# Patient Record
Sex: FEMALE | Race: WHITE | NOT HISPANIC OR LATINO
[De-identification: names, ages, dates, MRNs, and addresses within clinical notes are randomized per-mention and may not be internally consistent; named-entity substitution may affect disease eponyms.]

---

## 2019-12-16 ENCOUNTER — APPOINTMENT (OUTPATIENT)
Dept: RADIOLOGY | Facility: CLINIC | Age: 74
End: 2019-12-16

## 2019-12-16 ENCOUNTER — APPOINTMENT (OUTPATIENT)
Dept: MAMMOGRAPHY | Facility: CLINIC | Age: 74
End: 2019-12-16
Payer: MEDICARE

## 2019-12-16 PROCEDURE — 77067 SCR MAMMO BI INCL CAD: CPT

## 2019-12-16 PROCEDURE — 77080 DXA BONE DENSITY AXIAL: CPT

## 2019-12-16 PROCEDURE — 77063 BREAST TOMOSYNTHESIS BI: CPT

## 2021-04-28 ENCOUNTER — APPOINTMENT (OUTPATIENT)
Dept: DERMATOLOGY | Facility: CLINIC | Age: 76
End: 2021-04-28
Payer: MEDICARE

## 2021-04-28 PROBLEM — Z00.00 ENCOUNTER FOR PREVENTIVE HEALTH EXAMINATION: Status: ACTIVE | Noted: 2021-04-28

## 2021-04-28 PROCEDURE — 11102 TANGNTL BX SKIN SINGLE LES: CPT

## 2021-04-28 PROCEDURE — 99202 OFFICE O/P NEW SF 15 MIN: CPT | Mod: 25

## 2021-04-28 PROCEDURE — 11103 TANGNTL BX SKIN EA SEP/ADDL: CPT | Mod: 59

## 2021-05-13 ENCOUNTER — APPOINTMENT (OUTPATIENT)
Dept: OBGYN | Facility: CLINIC | Age: 76
End: 2021-05-13
Payer: MEDICARE

## 2021-05-13 VITALS
HEIGHT: 62 IN | SYSTOLIC BLOOD PRESSURE: 130 MMHG | BODY MASS INDEX: 32.39 KG/M2 | DIASTOLIC BLOOD PRESSURE: 80 MMHG | WEIGHT: 176 LBS

## 2021-05-13 DIAGNOSIS — R35.0 FREQUENCY OF MICTURITION: ICD-10-CM

## 2021-05-13 DIAGNOSIS — Z12.31 ENCOUNTER FOR SCREENING MAMMOGRAM FOR MALIGNANT NEOPLASM OF BREAST: ICD-10-CM

## 2021-05-13 DIAGNOSIS — N81.6 RECTOCELE: ICD-10-CM

## 2021-05-13 DIAGNOSIS — R35.1 NOCTURIA: ICD-10-CM

## 2021-05-13 DIAGNOSIS — N95.2 POSTMENOPAUSAL ATROPHIC VAGINITIS: ICD-10-CM

## 2021-05-13 DIAGNOSIS — Z01.419 ENCOUNTER FOR GYNECOLOGICAL EXAMINATION (GENERAL) (ROUTINE) W/OUT ABNORMAL FINDINGS: ICD-10-CM

## 2021-05-13 DIAGNOSIS — R31.29 OTHER MICROSCOPIC HEMATURIA: ICD-10-CM

## 2021-05-13 PROCEDURE — 99204 OFFICE O/P NEW MOD 45 MIN: CPT | Mod: 25

## 2021-05-13 PROCEDURE — G0101: CPT

## 2021-05-13 PROCEDURE — 81003 URINALYSIS AUTO W/O SCOPE: CPT | Mod: QW

## 2021-05-13 NOTE — PHYSICAL EXAM
[Appropriately responsive] : appropriately responsive [Alert] : alert [No Acute Distress] : no acute distress [No Lymphadenopathy] : no lymphadenopathy [Soft] : soft [Non-tender] : non-tender [Non-distended] : non-distended [No HSM] : No HSM [No Lesions] : no lesions [No Mass] : no mass [Oriented x3] : oriented x3 [Examination Of The Breasts] : a normal appearance [No Masses] : no breast masses were palpable [Labia Majora] : normal [Labia Minora] : normal [Cystocele] : a cystocele [Rectocele] : a rectocele [Normal] : normal [Uterine Adnexae] : normal [Vulvar Atrophy] : vulvar atrophy [Atrophy] : atrophy [FreeTextEntry5] : normal position even with bearing down

## 2021-05-13 NOTE — HISTORY OF PRESENT ILLNESS
[Patient reported mammogram was normal] : Patient reported mammogram was normal [Patient reported PAP Smear was normal] : Patient reported PAP Smear was normal [Patient reported bone density results were normal] : Patient reported bone density results were normal [Patient reported colonoscopy was normal] : Patient reported colonoscopy was normal [Mammogramdate] : 2019 [PapSmeardate] : age 70 [TextBox_31] : never had abn. pap in the past [BoneDensityDate] : 2019 [ColonoscopyDate] : 2019 [PGHxTotal] : 3 [[facility] : [facility] [FreeTextEntry1] :  , last baby over 9 pounds

## 2021-05-13 NOTE — REVIEW OF SYSTEMS
[Patient Intake Form Reviewed] : Patient intake form was reviewed [Frequency] : frequency [Negative] : Heme/Lymph

## 2021-05-13 NOTE — HISTORY OF PRESENT ILLNESS
[Patient reported mammogram was normal] : Patient reported mammogram was normal [Patient reported PAP Smear was normal] : Patient reported PAP Smear was normal [Patient reported bone density results were normal] : Patient reported bone density results were normal [Patient reported colonoscopy was normal] : Patient reported colonoscopy was normal [Mammogramdate] : 2019 [PapSmeardate] : age 70 [TextBox_31] : never had abn. pap in the past [BoneDensityDate] : 2019 [ColonoscopyDate] : 2019 [PGHxTotal] : 3 [[facility] [FreeTextEntry1] :  , last baby over 9 pounds

## 2021-05-13 NOTE — REASON FOR VISIT
[Initial] : an initial consultation for Tom Clements), Obstetrics and Gynecology  [address] [place] [address]  [address] [place] [address]  Phone: [phone]  Fax: [phone]

## 2021-05-13 NOTE — DISCUSSION/SUMMARY
[FreeTextEntry1] : Well woman exam\par \par pap done\par mammo ordered\par recommended taking vit. D 2000 units daily and through diet obtain 1200 mg of calcium along with 2.5 hours of weight bearing exercise per week\par return in 1 year\par \par rectocele/cystocele-Pt is not interested in surgical options for repair at this time\par ua-moderate blood, no leuks, will culture, rec. pt see urogynecologist for further evaluation, Dr. Carlos Chavez given    evaluate for OAB\par \par Vaginal /vulvar atropy-estrace cream-RBAD

## 2021-05-15 LAB — BACTERIA UR CULT: NORMAL

## 2022-03-29 ENCOUNTER — APPOINTMENT (OUTPATIENT)
Dept: PULMONOLOGY | Facility: CLINIC | Age: 77
End: 2022-03-29
Payer: MEDICARE

## 2022-03-29 VITALS
HEIGHT: 61 IN | DIASTOLIC BLOOD PRESSURE: 80 MMHG | WEIGHT: 178 LBS | BODY MASS INDEX: 33.61 KG/M2 | SYSTOLIC BLOOD PRESSURE: 140 MMHG | HEART RATE: 86 BPM | OXYGEN SATURATION: 96 % | RESPIRATION RATE: 16 BRPM

## 2022-03-29 DIAGNOSIS — Z86.39 PERSONAL HISTORY OF OTHER ENDOCRINE, NUTRITIONAL AND METABOLIC DISEASE: ICD-10-CM

## 2022-03-29 DIAGNOSIS — Z86.79 PERSONAL HISTORY OF OTHER DISEASES OF THE CIRCULATORY SYSTEM: ICD-10-CM

## 2022-03-29 PROCEDURE — 99204 OFFICE O/P NEW MOD 45 MIN: CPT

## 2022-03-29 RX ORDER — LOSARTAN POTASSIUM 100 MG/1
100 TABLET, FILM COATED ORAL
Qty: 90 | Refills: 0 | Status: ACTIVE | COMMUNITY
Start: 2021-12-27

## 2022-03-29 RX ORDER — ROSUVASTATIN CALCIUM 10 MG/1
10 TABLET, FILM COATED ORAL
Qty: 90 | Refills: 0 | Status: ACTIVE | COMMUNITY
Start: 2022-03-20

## 2022-03-29 RX ORDER — ESTRADIOL 0.1 MG/G
0.1 CREAM VAGINAL
Qty: 1 | Refills: 2 | Status: COMPLETED | COMMUNITY
Start: 2021-05-13 | End: 2022-03-29

## 2022-03-29 RX ORDER — FLUTICASONE PROPIONATE 50 UG/1
50 SPRAY, METERED NASAL
Qty: 16 | Refills: 0 | Status: COMPLETED | COMMUNITY
Start: 2022-03-17

## 2022-03-29 RX ORDER — AMOXICILLIN AND CLAVULANATE POTASSIUM 875; 125 MG/1; MG/1
875-125 TABLET, COATED ORAL
Qty: 14 | Refills: 0 | Status: COMPLETED | COMMUNITY
Start: 2022-03-17

## 2022-07-14 ENCOUNTER — APPOINTMENT (OUTPATIENT)
Dept: PULMONOLOGY | Facility: CLINIC | Age: 77
End: 2022-07-14

## 2023-06-16 ENCOUNTER — APPOINTMENT (OUTPATIENT)
Dept: PULMONOLOGY | Facility: CLINIC | Age: 78
End: 2023-06-16
Payer: MEDICARE

## 2023-06-16 VITALS — BODY MASS INDEX: 33.61 KG/M2 | HEIGHT: 61 IN | WEIGHT: 178 LBS | RESPIRATION RATE: 16 BRPM

## 2023-06-16 VITALS
HEART RATE: 92 BPM | OXYGEN SATURATION: 97 % | DIASTOLIC BLOOD PRESSURE: 82 MMHG | RESPIRATION RATE: 16 BRPM | SYSTOLIC BLOOD PRESSURE: 130 MMHG

## 2023-06-16 PROCEDURE — 99214 OFFICE O/P EST MOD 30 MIN: CPT

## 2023-06-16 RX ORDER — LEVOTHYROXINE SODIUM 0.03 MG/1
25 TABLET ORAL
Qty: 30 | Refills: 0 | Status: DISCONTINUED | COMMUNITY
Start: 2022-03-25 | End: 2023-06-16

## 2023-06-16 RX ORDER — MULTIVITAMIN
TABLET ORAL
Refills: 0 | Status: ACTIVE | COMMUNITY

## 2023-06-16 NOTE — PHYSICAL EXAM
[No Acute Distress] : no acute distress [Low Lying Soft Palate] : low lying soft palate [Elongated Uvula] : elongated uvula [Supple] : supple [Normal Rate/Rhythm] : normal rate/rhythm [Normal Pulses] : normal pulses [Normal S1, S2] : normal s1, s2 [No Resp Distress] : no resp distress [No Acc Muscle Use] : no acc muscle use [Normal Rhythm and Effort] : normal rhythm and effort [Clear to Auscultation Bilaterally] : clear to auscultation bilaterally [Benign] : benign [No Clubbing] : no clubbing [Normal Color/ Pigmentation] : normal color/ pigmentation [Oriented x3] : oriented x3 [Normal Mood] : normal mood [Normal Affect] : normal affect

## 2023-06-16 NOTE — HISTORY OF PRESENT ILLNESS
[Former] : former [< 20 pack-years] : < 20 pack-years [TextBox_4] : Last here for initial consult in March 2022.\par \par Still  C/O wheeze at night. No wheeze during the day. Only notices when she lays down. She still has some GUAMAN; maybe worse over the past year. No chronic cough.\par \par Hx allergies. Gets cough around dogs. \par \par On last visit ordered labwork, PFT which she did not get around to doing. \par \par Still using her 's albuterol. Uses it BID for wheeze and sob; she feels it helps. \par \par Her children have pets in their home. She gets some SOB, tightness when she visits. Some cough. Antithistamine before visit often helps. \par \par She snores. EDS. \par \par Former light smoker. Quit many years ago.

## 2023-06-16 NOTE — REVIEW OF SYSTEMS
[Obesity] : obesity [Negative] : Psychiatric [TextBox_14] : per HPI [TextBox_30] : per HPI [TextBox_57] : per HPI

## 2023-06-26 ENCOUNTER — APPOINTMENT (OUTPATIENT)
Dept: DERMATOLOGY | Facility: CLINIC | Age: 78
End: 2023-06-26
Payer: MEDICARE

## 2023-06-26 PROCEDURE — 99213 OFFICE O/P EST LOW 20 MIN: CPT | Mod: 25

## 2023-06-26 PROCEDURE — 17110 DESTRUCTION B9 LES UP TO 14: CPT

## 2023-08-08 ENCOUNTER — RX RENEWAL (OUTPATIENT)
Age: 78
End: 2023-08-08

## 2023-09-06 ENCOUNTER — APPOINTMENT (OUTPATIENT)
Dept: PULMONOLOGY | Facility: CLINIC | Age: 78
End: 2023-09-06
Payer: MEDICARE

## 2023-09-06 VITALS — WEIGHT: 180 LBS | HEIGHT: 62 IN | BODY MASS INDEX: 33.13 KG/M2

## 2023-09-06 VITALS
DIASTOLIC BLOOD PRESSURE: 78 MMHG | OXYGEN SATURATION: 96 % | RESPIRATION RATE: 16 BRPM | SYSTOLIC BLOOD PRESSURE: 128 MMHG | HEART RATE: 99 BPM

## 2023-09-06 DIAGNOSIS — R06.2 WHEEZING: ICD-10-CM

## 2023-09-06 PROCEDURE — 99214 OFFICE O/P EST MOD 30 MIN: CPT | Mod: 25

## 2023-09-06 PROCEDURE — 94729 DIFFUSING CAPACITY: CPT

## 2023-09-06 PROCEDURE — 94727 GAS DIL/WSHOT DETER LNG VOL: CPT

## 2023-09-06 PROCEDURE — 94010 BREATHING CAPACITY TEST: CPT

## 2023-09-06 PROCEDURE — 85018 HEMOGLOBIN: CPT | Mod: QW

## 2023-09-06 RX ORDER — FEXOFENADINE HCL 60 MG
CAPSULE ORAL
Refills: 0 | Status: DISCONTINUED | COMMUNITY
End: 2023-09-06

## 2023-09-06 NOTE — HISTORY OF PRESENT ILLNESS
[Former] : former [< 20 pack-years] : < 20 pack-years [TextBox_4] : Hx wheeze, sob.  Feeling much better on the arnuity. No further wheeze, sob.   Was at her children's house who has dog, cat; hoarse voice since then.  She gets some SOB, tightness when she visits. Some cough. Antithistamine before visit often helps.    Hx allergies. Gets cough around dogs.   Labwork done showed allergies to dog, cat, mold. Normal Eos; elevated IgE.    PFT done today is WNL.   She snores. EDS. Does not want sleep study.  Former light smoker. Quit many years ago.

## 2023-09-06 NOTE — PLAN
[TextEntry] : For now, continue ICS. Rinse mouth after use.  Albuterol prn.   Flonase and zyrtec prn for allergies. Consider allergy eval for AI since the home of her children causes significant symptoms. She defers. If hoarseness does not improve with trt for allergies, trial of being off ICS. If persists, see ENT. Annual flu vaccine. She does not want a sleep study. Weight loss. Further reccs will come.

## 2023-12-06 ENCOUNTER — APPOINTMENT (OUTPATIENT)
Dept: PULMONOLOGY | Facility: CLINIC | Age: 78
End: 2023-12-06
Payer: MEDICARE

## 2023-12-06 VITALS
HEART RATE: 83 BPM | RESPIRATION RATE: 16 BRPM | SYSTOLIC BLOOD PRESSURE: 124 MMHG | OXYGEN SATURATION: 97 % | DIASTOLIC BLOOD PRESSURE: 80 MMHG

## 2023-12-06 VITALS — BODY MASS INDEX: 32.94 KG/M2 | WEIGHT: 179 LBS | HEIGHT: 62 IN

## 2023-12-06 DIAGNOSIS — Z87.891 PERSONAL HISTORY OF NICOTINE DEPENDENCE: ICD-10-CM

## 2023-12-06 DIAGNOSIS — J30.9 ALLERGIC RHINITIS, UNSPECIFIED: ICD-10-CM

## 2023-12-06 DIAGNOSIS — G47.33 OBSTRUCTIVE SLEEP APNEA (ADULT) (PEDIATRIC): ICD-10-CM

## 2023-12-06 DIAGNOSIS — J45.909 UNSPECIFIED ASTHMA, UNCOMPLICATED: ICD-10-CM

## 2023-12-06 PROCEDURE — 99214 OFFICE O/P EST MOD 30 MIN: CPT

## 2023-12-06 RX ORDER — FEXOFENADINE HCL 60 MG
CAPSULE ORAL
Refills: 0 | Status: ACTIVE | COMMUNITY

## 2024-04-03 ENCOUNTER — RX RENEWAL (OUTPATIENT)
Age: 79
End: 2024-04-03

## 2024-04-03 RX ORDER — ALBUTEROL SULFATE 90 UG/1
108 (90 BASE) INHALANT RESPIRATORY (INHALATION) EVERY 4 HOURS
Qty: 1 | Refills: 3 | Status: ACTIVE | COMMUNITY
Start: 2022-03-29 | End: 1900-01-01

## 2024-06-24 ENCOUNTER — RX RENEWAL (OUTPATIENT)
Age: 79
End: 2024-06-24

## 2024-06-25 RX ORDER — FLUTICASONE FUROATE 200 UG/1
200 POWDER RESPIRATORY (INHALATION) DAILY
Qty: 90 | Refills: 3 | Status: ACTIVE | COMMUNITY
Start: 2023-06-16 | End: 1900-01-01

## 2024-07-03 ENCOUNTER — OFFICE (OUTPATIENT)
Dept: URBAN - METROPOLITAN AREA CLINIC 113 | Facility: CLINIC | Age: 79
Setting detail: OPHTHALMOLOGY
End: 2024-07-03
Payer: MEDICARE

## 2024-07-03 DIAGNOSIS — H25.12: ICD-10-CM

## 2024-07-03 DIAGNOSIS — H43.393: ICD-10-CM

## 2024-07-03 DIAGNOSIS — H35.033: ICD-10-CM

## 2024-07-03 PROCEDURE — 92250 FUNDUS PHOTOGRAPHY W/I&R: CPT | Performed by: OPHTHALMOLOGY

## 2024-07-03 PROCEDURE — 92004 COMPRE OPH EXAM NEW PT 1/>: CPT | Performed by: OPHTHALMOLOGY

## 2024-07-03 ASSESSMENT — CONFRONTATIONAL VISUAL FIELD TEST (CVF)
OD_FINDINGS: FULL
OS_FINDINGS: FULL

## 2024-08-01 ENCOUNTER — APPOINTMENT (OUTPATIENT)
Dept: PULMONOLOGY | Facility: CLINIC | Age: 79
End: 2024-08-01
Payer: MEDICARE

## 2024-08-01 VITALS
OXYGEN SATURATION: 95 % | DIASTOLIC BLOOD PRESSURE: 68 MMHG | BODY MASS INDEX: 34.93 KG/M2 | RESPIRATION RATE: 16 BRPM | SYSTOLIC BLOOD PRESSURE: 126 MMHG | WEIGHT: 185 LBS | HEART RATE: 96 BPM | HEIGHT: 61 IN

## 2024-08-01 DIAGNOSIS — J45.909 UNSPECIFIED ASTHMA, UNCOMPLICATED: ICD-10-CM

## 2024-08-01 DIAGNOSIS — J30.9 ALLERGIC RHINITIS, UNSPECIFIED: ICD-10-CM

## 2024-08-01 DIAGNOSIS — G47.33 OBSTRUCTIVE SLEEP APNEA (ADULT) (PEDIATRIC): ICD-10-CM

## 2024-08-01 DIAGNOSIS — Z87.891 PERSONAL HISTORY OF NICOTINE DEPENDENCE: ICD-10-CM

## 2024-08-01 PROCEDURE — G2211 COMPLEX E/M VISIT ADD ON: CPT

## 2024-08-01 PROCEDURE — 99214 OFFICE O/P EST MOD 30 MIN: CPT

## 2024-08-01 NOTE — PLAN
[TextEntry] : Recc can hold arnuity for a couple of days and see if voice improves; she does not want to do that. Recc ENT eval; she does not want to do that. WIll give MDI ICS instead of dry powder and see if that helps.  Rinse mouth after use.  Albuterol prn.  Jesica next visit. Use flonase. OTC nasal saline. Allergy med.   Annual flu vaccine. She does not want a sleep study. Weight loss. Further reccs will come.

## 2024-08-01 NOTE — HISTORY OF PRESENT ILLNESS
[Former] : former [< 20 pack-years] : < 20 pack-years [TextBox_4] : Hx wheeze, sob.  Feeling much better on the arnuity. No further wheeze, sob. Said it made a huge difference.    C/O raspy voice, phlegm in her chest and throat.    On allegra daily now. Does not want to see an allergist.   Previously, labwork done showed allergies to dog, cat, mold. Normal Eos; elevated IgE.    PFT done 9/6/23 is WNL.   She snores. EDS. Does not want sleep study.  Former light smoker. Quit many years ago.

## 2024-08-02 ENCOUNTER — RX CHANGE (OUTPATIENT)
Age: 79
End: 2024-08-02

## 2024-08-02 RX ORDER — FLUTICASONE FUROATE 100 UG/1
100 POWDER RESPIRATORY (INHALATION) DAILY
Qty: 3 | Refills: 3 | Status: ACTIVE | COMMUNITY
Start: 1900-01-01 | End: 1900-01-01

## 2024-08-05 RX ORDER — FLUTICASONE PROPIONATE 110 UG/1
110 AEROSOL, METERED RESPIRATORY (INHALATION) TWICE DAILY
Qty: 3 | Refills: 3 | Status: ACTIVE | COMMUNITY
Start: 2024-08-01 | End: 1900-01-01

## 2024-08-16 ENCOUNTER — OFFICE (OUTPATIENT)
Dept: URBAN - METROPOLITAN AREA CLINIC 94 | Facility: CLINIC | Age: 79
Setting detail: OPHTHALMOLOGY
End: 2024-08-16
Payer: MEDICARE

## 2024-08-16 DIAGNOSIS — H35.033: ICD-10-CM

## 2024-08-16 DIAGNOSIS — H25.12: ICD-10-CM

## 2024-08-16 DIAGNOSIS — H25.13: ICD-10-CM

## 2024-08-16 DIAGNOSIS — H43.393: ICD-10-CM

## 2024-08-16 PROCEDURE — 92136 OPHTHALMIC BIOMETRY: CPT | Mod: 26,LT | Performed by: OPHTHALMOLOGY

## 2024-08-16 PROCEDURE — 92136 OPHTHALMIC BIOMETRY: CPT | Mod: TC | Performed by: OPHTHALMOLOGY

## 2024-08-16 PROCEDURE — 99213 OFFICE O/P EST LOW 20 MIN: CPT | Performed by: OPHTHALMOLOGY

## 2024-08-16 ASSESSMENT — CONFRONTATIONAL VISUAL FIELD TEST (CVF)
OD_FINDINGS: FULL
OS_FINDINGS: FULL

## 2024-09-05 ENCOUNTER — ASC (OUTPATIENT)
Dept: URBAN - METROPOLITAN AREA SURGERY 8 | Facility: SURGERY | Age: 79
Setting detail: OPHTHALMOLOGY
End: 2024-09-05
Payer: MEDICARE

## 2024-09-05 DIAGNOSIS — H25.12: ICD-10-CM

## 2024-09-05 PROCEDURE — 68841 INSJ RX ELUT IMPLT LAC CANAL: CPT | Mod: LT | Performed by: OPHTHALMOLOGY

## 2024-09-05 PROCEDURE — 66984 XCAPSL CTRC RMVL W/O ECP: CPT | Mod: LT | Performed by: OPHTHALMOLOGY

## 2024-09-06 ENCOUNTER — RX ONLY (RX ONLY)
Age: 79
End: 2024-09-06

## 2024-09-06 ENCOUNTER — OFFICE (OUTPATIENT)
Dept: URBAN - METROPOLITAN AREA CLINIC 94 | Facility: CLINIC | Age: 79
Setting detail: OPHTHALMOLOGY
End: 2024-09-06
Payer: MEDICARE

## 2024-09-06 DIAGNOSIS — H27.02: ICD-10-CM

## 2024-09-06 DIAGNOSIS — H40.052: ICD-10-CM

## 2024-09-06 DIAGNOSIS — H59.023: ICD-10-CM

## 2024-09-06 PROCEDURE — 99024 POSTOP FOLLOW-UP VISIT: CPT | Performed by: OPHTHALMOLOGY

## 2024-09-06 ASSESSMENT — CONFRONTATIONAL VISUAL FIELD TEST (CVF)
OS_FINDINGS: FULL
OD_FINDINGS: FULL

## 2024-09-07 ENCOUNTER — OFFICE (OUTPATIENT)
Dept: URBAN - METROPOLITAN AREA CLINIC 116 | Facility: CLINIC | Age: 79
Setting detail: OPHTHALMOLOGY
End: 2024-09-07
Payer: MEDICARE

## 2024-09-07 DIAGNOSIS — H59.023: ICD-10-CM

## 2024-09-07 DIAGNOSIS — H27.02: ICD-10-CM

## 2024-09-07 DIAGNOSIS — H40.052: ICD-10-CM

## 2024-09-07 PROCEDURE — 99024 POSTOP FOLLOW-UP VISIT: CPT | Performed by: OPTOMETRIST

## 2024-09-07 ASSESSMENT — CONFRONTATIONAL VISUAL FIELD TEST (CVF)
OS_FINDINGS: FULL
OD_FINDINGS: FULL

## 2024-09-09 ENCOUNTER — OFFICE (OUTPATIENT)
Dept: URBAN - METROPOLITAN AREA CLINIC 105 | Facility: CLINIC | Age: 79
Setting detail: OPHTHALMOLOGY
End: 2024-09-09
Payer: MEDICARE

## 2024-09-09 ENCOUNTER — RX ONLY (RX ONLY)
Age: 79
End: 2024-09-09

## 2024-09-09 DIAGNOSIS — H43.393: ICD-10-CM

## 2024-09-09 DIAGNOSIS — H35.033: ICD-10-CM

## 2024-09-09 PROCEDURE — 92134 CPTRZ OPH DX IMG PST SGM RTA: CPT | Performed by: OPHTHALMOLOGY

## 2024-09-09 PROCEDURE — 99024 POSTOP FOLLOW-UP VISIT: CPT | Performed by: OPHTHALMOLOGY

## 2024-09-09 ASSESSMENT — CONFRONTATIONAL VISUAL FIELD TEST (CVF)
OS_FINDINGS: FULL
OD_FINDINGS: FULL

## 2024-09-10 ENCOUNTER — ASC (OUTPATIENT)
Dept: URBAN - METROPOLITAN AREA SURGERY 8 | Facility: SURGERY | Age: 79
Setting detail: OPHTHALMOLOGY
End: 2024-09-10
Payer: MEDICARE

## 2024-09-10 DIAGNOSIS — H59.023: ICD-10-CM

## 2024-09-10 PROCEDURE — 67036 REMOVAL OF INNER EYE FLUID: CPT | Mod: LT | Performed by: OPHTHALMOLOGY

## 2024-09-10 PROCEDURE — 66852 REMOVAL OF LENS MATERIAL: CPT | Mod: LT | Performed by: OPHTHALMOLOGY

## 2024-09-11 ENCOUNTER — OFFICE (OUTPATIENT)
Dept: URBAN - METROPOLITAN AREA CLINIC 94 | Facility: CLINIC | Age: 79
Setting detail: OPHTHALMOLOGY
End: 2024-09-11
Payer: MEDICARE

## 2024-09-11 DIAGNOSIS — H40.052: ICD-10-CM

## 2024-09-11 DIAGNOSIS — H59.023: ICD-10-CM

## 2024-09-11 PROBLEM — H18.20 CORNEAL EDEMA, UNSPECIFIED ; LEFT EYE: Status: RESOLVED | Noted: 2024-09-06 | Resolved: 2024-09-11

## 2024-09-11 PROCEDURE — 99024 POSTOP FOLLOW-UP VISIT: CPT | Performed by: PHYSICIAN ASSISTANT

## 2024-09-11 ASSESSMENT — CONFRONTATIONAL VISUAL FIELD TEST (CVF)
OD_FINDINGS: FULL
OS_FINDINGS: FULL

## 2024-09-13 ENCOUNTER — OFFICE (OUTPATIENT)
Dept: URBAN - METROPOLITAN AREA CLINIC 105 | Facility: CLINIC | Age: 79
Setting detail: OPHTHALMOLOGY
End: 2024-09-13
Payer: MEDICARE

## 2024-09-13 ENCOUNTER — RX ONLY (RX ONLY)
Age: 79
End: 2024-09-13

## 2024-09-13 DIAGNOSIS — H59.023: ICD-10-CM

## 2024-09-13 PROCEDURE — 99024 POSTOP FOLLOW-UP VISIT: CPT | Performed by: OPHTHALMOLOGY

## 2024-09-13 ASSESSMENT — CONFRONTATIONAL VISUAL FIELD TEST (CVF)
OS_FINDINGS: FULL
OD_FINDINGS: FULL

## 2024-09-14 ENCOUNTER — OFFICE (OUTPATIENT)
Dept: URBAN - METROPOLITAN AREA CLINIC 113 | Facility: CLINIC | Age: 79
Setting detail: OPHTHALMOLOGY
End: 2024-09-14
Payer: MEDICARE

## 2024-09-14 DIAGNOSIS — H27.02: ICD-10-CM

## 2024-09-14 DIAGNOSIS — H59.023: ICD-10-CM

## 2024-09-14 PROCEDURE — 99024 POSTOP FOLLOW-UP VISIT: CPT | Performed by: OPTOMETRIST

## 2024-09-14 ASSESSMENT — CONFRONTATIONAL VISUAL FIELD TEST (CVF)
OD_FINDINGS: FULL
OS_FINDINGS: FULL

## 2024-09-18 ENCOUNTER — OFFICE (OUTPATIENT)
Dept: URBAN - METROPOLITAN AREA CLINIC 94 | Facility: CLINIC | Age: 79
Setting detail: OPHTHALMOLOGY
End: 2024-09-18
Payer: MEDICARE

## 2024-09-18 ENCOUNTER — RX ONLY (RX ONLY)
Age: 79
End: 2024-09-18

## 2024-09-18 DIAGNOSIS — H40.052: ICD-10-CM

## 2024-09-18 DIAGNOSIS — H59.023: ICD-10-CM

## 2024-09-18 DIAGNOSIS — H27.02: ICD-10-CM

## 2024-09-18 PROCEDURE — 99024 POSTOP FOLLOW-UP VISIT: CPT | Performed by: PHYSICIAN ASSISTANT

## 2024-09-18 ASSESSMENT — CONFRONTATIONAL VISUAL FIELD TEST (CVF)
OD_FINDINGS: FULL
OS_FINDINGS: FULL

## 2024-09-25 ENCOUNTER — RX ONLY (RX ONLY)
Age: 79
End: 2024-09-25

## 2024-09-25 ENCOUNTER — OFFICE (OUTPATIENT)
Dept: URBAN - METROPOLITAN AREA CLINIC 94 | Facility: CLINIC | Age: 79
Setting detail: OPHTHALMOLOGY
End: 2024-09-25
Payer: MEDICARE

## 2024-09-25 DIAGNOSIS — H27.02: ICD-10-CM

## 2024-09-25 DIAGNOSIS — H59.022: ICD-10-CM

## 2024-09-25 PROCEDURE — 99024 POSTOP FOLLOW-UP VISIT: CPT | Performed by: PHYSICIAN ASSISTANT

## 2024-09-25 ASSESSMENT — CONFRONTATIONAL VISUAL FIELD TEST (CVF)
OD_FINDINGS: FULL
OS_FINDINGS: FULL

## 2024-10-01 PROBLEM — H27.02 APHAKIA; LEFT EYE: Status: ACTIVE | Noted: 2024-09-06

## 2024-10-01 PROBLEM — H40.052 OCULAR HYPERTENSION; LEFT EYE: Status: ACTIVE | Noted: 2024-09-06

## 2024-10-01 PROBLEM — H43.391 VITREOUS FLOATERS;  , RIGHT EYE: Status: ACTIVE | Noted: 2024-09-25

## 2024-10-01 PROBLEM — H59.022 LENS FRAGMENTS IN EYE FOLLOWING CATARACT SURGERY; LEFT EYE: Status: ACTIVE | Noted: 2024-09-25

## 2024-10-01 ASSESSMENT — REFRACTION_CURRENTRX
OS_AXIS: 080
OS_SPHERE: +0.75
OS_CYLINDER: -0.75
OD_AXIS: 120
OS_OVR_VA: 20/
OD_SPHERE: PLANO
OS_VPRISM_DIRECTION: PROGS
OD_CYLINDER: -0.75
OS_OVR_VA: 20/
OD_OVR_VA: 20/
OD_OVR_VA: 20/
OS_ADD: +2.50
OD_ADD: +2.50
OD_VPRISM_DIRECTION: PROGS

## 2024-10-01 ASSESSMENT — REFRACTION_MANIFEST
OS_VA1: 20/25
OS_ADD: +3.00
OS_VA2: 20/20
OD_VA2: 20/20
OS_VA1: 20/30-
OS_AXIS: 082
OS_SPHERE: +3.00
OD_VA1: 20/20
OS_VA1: 20/25
OD_ADD: +3.00
OS_CYLINDER: SPH
OS_SPHERE: +10.25
OS_CYLINDER: -0.50
OU_VA: 20/20
OS_SPHERE: +9.75
OS_SPHERE: +9.00
OS_CYLINDER: -2.75
OS_VA1: 20/25
OD_CYLINDER: -1.00
OD_AXIS: 110
OS_CYLINDER: SPHERE
OS_AXIS: 090
OD_SPHERE: +0.75

## 2024-10-01 ASSESSMENT — KERATOMETRY
OD_K2POWER_DIOPTERS: 43.75
OS_K1POWER_DIOPTERS: 44.50
OD_K1POWER_DIOPTERS: 43.25
OS_AXISANGLE_DEGREES: 119
OD_AXISANGLE_DEGREES: 059
OS_K2POWER_DIOPTERS: 45.00

## 2024-10-08 ENCOUNTER — RX CHANGE (OUTPATIENT)
Age: 79
End: 2024-10-08

## 2024-10-08 RX ORDER — FLUTICASONE FUROATE 100 UG/1
100 POWDER RESPIRATORY (INHALATION) DAILY
Qty: 1 | Refills: 5 | Status: ACTIVE | COMMUNITY
Start: 1900-01-01 | End: 1900-01-01

## 2024-11-01 ENCOUNTER — ASC (OUTPATIENT)
Dept: URBAN - METROPOLITAN AREA SURGERY 8 | Facility: SURGERY | Age: 79
Setting detail: OPHTHALMOLOGY
End: 2024-11-01
Payer: MEDICARE

## 2024-11-01 DIAGNOSIS — H27.02: ICD-10-CM

## 2024-11-01 PROCEDURE — 66985 INSERT LENS PROSTHESIS: CPT | Mod: 79,LT | Performed by: OPHTHALMOLOGY

## 2024-11-02 ENCOUNTER — OFFICE (OUTPATIENT)
Dept: URBAN - METROPOLITAN AREA CLINIC 94 | Facility: CLINIC | Age: 79
Setting detail: OPHTHALMOLOGY
End: 2024-11-02
Payer: MEDICARE

## 2024-11-02 ENCOUNTER — RX ONLY (RX ONLY)
Age: 79
End: 2024-11-02

## 2024-11-02 DIAGNOSIS — H27.02: ICD-10-CM

## 2024-11-02 PROCEDURE — 99024 POSTOP FOLLOW-UP VISIT: CPT | Performed by: SPECIALIST

## 2024-11-02 ASSESSMENT — CONFRONTATIONAL VISUAL FIELD TEST (CVF)
OS_FINDINGS: FULL
OD_FINDINGS: FULL

## 2024-11-02 ASSESSMENT — REFRACTION_CURRENTRX
OS_ADD: +2.50
OS_VPRISM_DIRECTION: PROGS
OS_AXIS: 080
OD_ADD: +2.50
OS_CYLINDER: -0.75
OD_AXIS: 120
OD_OVR_VA: 20/
OD_OVR_VA: 20/
OS_SPHERE: +0.75
OS_OVR_VA: 20/
OD_CYLINDER: -0.75
OS_OVR_VA: 20/
OD_VPRISM_DIRECTION: PROGS
OD_SPHERE: PLANO

## 2024-11-02 ASSESSMENT — KERATOMETRY
OS_K1POWER_DIOPTERS: 44.50
OD_K1POWER_DIOPTERS: 43.25
OD_AXISANGLE_DEGREES: 059
OD_K2POWER_DIOPTERS: 43.75
OS_AXISANGLE_DEGREES: 119
OS_K2POWER_DIOPTERS: 45.00

## 2024-11-02 ASSESSMENT — TONOMETRY
OD_IOP_MMHG: 16
OS_IOP_MMHG: 16

## 2024-11-02 ASSESSMENT — REFRACTION_AUTOREFRACTION
OS_AXIS: 089
OD_SPHERE: +0.50
OS_SPHERE: +9.50
OD_CYLINDER: -0.75
OD_AXIS: 111
OS_CYLINDER: -0.50

## 2024-11-02 ASSESSMENT — VISUAL ACUITY
OD_BCVA: 20/200
OS_BCVA: 20/20

## 2024-11-05 ENCOUNTER — OFFICE (OUTPATIENT)
Dept: URBAN - METROPOLITAN AREA CLINIC 94 | Facility: CLINIC | Age: 79
Setting detail: OPHTHALMOLOGY
End: 2024-11-05
Payer: MEDICARE

## 2024-11-05 DIAGNOSIS — H59.022: ICD-10-CM

## 2024-11-05 PROCEDURE — 99024 POSTOP FOLLOW-UP VISIT: CPT | Performed by: OPHTHALMOLOGY

## 2024-11-05 ASSESSMENT — REFRACTION_CURRENTRX
OD_VPRISM_DIRECTION: PROGS
OS_OVR_VA: 20/
OD_CYLINDER: -0.75
OS_SPHERE: +0.75
OS_ADD: +2.50
OS_VPRISM_DIRECTION: PROGS
OS_OVR_VA: 20/
OS_CYLINDER: -0.75
OD_OVR_VA: 20/
OD_OVR_VA: 20/
OD_ADD: +2.50
OS_AXIS: 080
OD_AXIS: 120
OD_SPHERE: PLANO

## 2024-11-05 ASSESSMENT — VISUAL ACUITY
OS_BCVA: 20/20
OD_BCVA: 20/50

## 2024-11-05 ASSESSMENT — TONOMETRY
OD_IOP_MMHG: 18
OS_IOP_MMHG: 17

## 2024-11-05 ASSESSMENT — REFRACTION_AUTOREFRACTION
OD_CYLINDER: -0.75
OS_AXIS: 089
OS_SPHERE: +9.50
OS_CYLINDER: -0.50
OD_SPHERE: +0.50
OD_AXIS: 111

## 2024-11-05 ASSESSMENT — CONFRONTATIONAL VISUAL FIELD TEST (CVF)
OD_FINDINGS: FULL
OS_FINDINGS: FULL

## 2024-11-05 ASSESSMENT — KERATOMETRY
OD_AXISANGLE_DEGREES: 059
OD_K2POWER_DIOPTERS: 43.75
OS_K2POWER_DIOPTERS: 45.00
OD_K1POWER_DIOPTERS: 43.25
OS_K1POWER_DIOPTERS: 44.50
OS_AXISANGLE_DEGREES: 119

## 2024-11-16 ENCOUNTER — OFFICE (OUTPATIENT)
Dept: URBAN - METROPOLITAN AREA CLINIC 94 | Facility: CLINIC | Age: 79
Setting detail: OPHTHALMOLOGY
End: 2024-11-16
Payer: MEDICARE

## 2024-11-16 DIAGNOSIS — H59.022: ICD-10-CM

## 2024-11-16 PROCEDURE — 99024 POSTOP FOLLOW-UP VISIT: CPT | Performed by: OPHTHALMOLOGY

## 2024-11-16 ASSESSMENT — KERATOMETRY
OS_AXISANGLE_DEGREES: 119
OS_K2POWER_DIOPTERS: 45.00
OD_K1POWER_DIOPTERS: 43.25
OS_K1POWER_DIOPTERS: 44.50
OD_K2POWER_DIOPTERS: 43.75
OD_AXISANGLE_DEGREES: 059

## 2024-11-16 ASSESSMENT — CONFRONTATIONAL VISUAL FIELD TEST (CVF)
OD_FINDINGS: FULL
OS_FINDINGS: FULL

## 2024-11-16 ASSESSMENT — VISUAL ACUITY
OD_BCVA: 20/25-1
OS_BCVA: 20/20

## 2024-11-16 ASSESSMENT — TONOMETRY: OD_IOP_MMHG: 19

## 2024-11-16 ASSESSMENT — REFRACTION_AUTOREFRACTION
OS_SPHERE: +9.50
OS_CYLINDER: -0.50
OS_AXIS: 089
OD_CYLINDER: -0.75
OD_SPHERE: +0.50
OD_AXIS: 111

## 2024-11-22 ENCOUNTER — ASC (OUTPATIENT)
Dept: URBAN - METROPOLITAN AREA SURGERY 8 | Facility: SURGERY | Age: 79
Setting detail: OPHTHALMOLOGY
End: 2024-11-22
Payer: MEDICARE

## 2024-11-22 DIAGNOSIS — H59.022: ICD-10-CM

## 2024-11-22 PROCEDURE — 68320 REVISE/GRAFT EYELID LINING: CPT | Mod: LT | Performed by: OPHTHALMOLOGY

## 2024-11-23 ENCOUNTER — OFFICE (OUTPATIENT)
Dept: URBAN - METROPOLITAN AREA CLINIC 94 | Facility: CLINIC | Age: 79
Setting detail: OPHTHALMOLOGY
End: 2024-11-23
Payer: MEDICARE

## 2024-11-23 DIAGNOSIS — H59.022: ICD-10-CM

## 2024-11-23 PROCEDURE — 92134 CPTRZ OPH DX IMG PST SGM RTA: CPT | Performed by: OPHTHALMOLOGY

## 2024-11-23 PROCEDURE — 99024 POSTOP FOLLOW-UP VISIT: CPT | Performed by: OPHTHALMOLOGY

## 2024-11-23 ASSESSMENT — KERATOMETRY
OD_K2POWER_DIOPTERS: 43.75
OS_K1POWER_DIOPTERS: 44.50
OD_AXISANGLE_DEGREES: 059
OS_AXISANGLE_DEGREES: 119
OS_K2POWER_DIOPTERS: 45.00
OD_K1POWER_DIOPTERS: 43.25

## 2024-11-23 ASSESSMENT — VISUAL ACUITY
OS_BCVA: 20/20-1
OD_BCVA: 20/100+1

## 2024-11-23 ASSESSMENT — CONFRONTATIONAL VISUAL FIELD TEST (CVF)
OS_FINDINGS: FULL
OD_FINDINGS: FULL

## 2024-11-23 ASSESSMENT — TONOMETRY
OD_IOP_MMHG: 17
OS_IOP_MMHG: 17

## 2024-11-23 ASSESSMENT — REFRACTION_AUTOREFRACTION
OD_CYLINDER: -0.75
OS_CYLINDER: -0.50
OS_AXIS: 089
OS_SPHERE: +9.50
OD_AXIS: 111
OD_SPHERE: +0.50

## 2024-12-03 ENCOUNTER — OFFICE (OUTPATIENT)
Dept: URBAN - METROPOLITAN AREA CLINIC 94 | Facility: CLINIC | Age: 79
Setting detail: OPHTHALMOLOGY
End: 2024-12-03
Payer: MEDICARE

## 2024-12-03 DIAGNOSIS — H59.022: ICD-10-CM

## 2024-12-03 PROCEDURE — 92134 CPTRZ OPH DX IMG PST SGM RTA: CPT | Performed by: OPHTHALMOLOGY

## 2024-12-03 PROCEDURE — 99024 POSTOP FOLLOW-UP VISIT: CPT | Performed by: OPHTHALMOLOGY

## 2024-12-03 ASSESSMENT — VISUAL ACUITY
OS_BCVA: 20/25+1
OD_BCVA: 20/25-1

## 2024-12-03 ASSESSMENT — REFRACTION_AUTOREFRACTION
OD_CYLINDER: -0.75
OD_SPHERE: +0.50
OS_SPHERE: +9.50
OS_CYLINDER: -0.50
OD_AXIS: 111
OS_AXIS: 089

## 2024-12-03 ASSESSMENT — CONFRONTATIONAL VISUAL FIELD TEST (CVF)
OS_FINDINGS: FULL
OD_FINDINGS: FULL

## 2024-12-03 ASSESSMENT — KERATOMETRY
OS_K1POWER_DIOPTERS: 44.50
OS_K2POWER_DIOPTERS: 45.00
OS_AXISANGLE_DEGREES: 119
OD_K2POWER_DIOPTERS: 43.75
OD_AXISANGLE_DEGREES: 059
OD_K1POWER_DIOPTERS: 43.25

## 2024-12-03 ASSESSMENT — TONOMETRY: OD_IOP_MMHG: 18

## 2024-12-06 ASSESSMENT — KERATOMETRY
OS_AXISANGLE_DEGREES: 119
OS_K2POWER_DIOPTERS: 45.00
OD_K2POWER_DIOPTERS: 43.75
OD_AXISANGLE_DEGREES: 059
OS_K1POWER_DIOPTERS: 44.50
OD_K1POWER_DIOPTERS: 43.25

## 2024-12-15 ENCOUNTER — OFFICE (OUTPATIENT)
Dept: URBAN - METROPOLITAN AREA CLINIC 63 | Facility: CLINIC | Age: 79
Setting detail: OPHTHALMOLOGY
End: 2024-12-15
Payer: MEDICARE

## 2024-12-15 DIAGNOSIS — H40.052: ICD-10-CM

## 2024-12-15 DIAGNOSIS — H26.491: ICD-10-CM

## 2024-12-15 DIAGNOSIS — H59.022: ICD-10-CM

## 2024-12-15 DIAGNOSIS — H27.02: ICD-10-CM

## 2024-12-15 PROCEDURE — 99024 POSTOP FOLLOW-UP VISIT: CPT | Performed by: INTERNAL MEDICINE

## 2024-12-15 ASSESSMENT — REFRACTION_AUTOREFRACTION
OS_AXIS: 089
OD_CYLINDER: -0.75
OD_AXIS: 111
OS_CYLINDER: -0.50
OD_SPHERE: +0.50
OS_SPHERE: +9.50

## 2024-12-15 ASSESSMENT — CONFRONTATIONAL VISUAL FIELD TEST (CVF)
OD_FINDINGS: FULL
OS_FINDINGS: FULL

## 2024-12-15 ASSESSMENT — KERATOMETRY
OS_AXISANGLE_DEGREES: 119
OD_K2POWER_DIOPTERS: 43.75
OS_K2POWER_DIOPTERS: 45.00
OD_K1POWER_DIOPTERS: 43.25
OD_AXISANGLE_DEGREES: 059
OS_K1POWER_DIOPTERS: 44.50

## 2024-12-15 ASSESSMENT — VISUAL ACUITY
OS_BCVA: 20/25-1
OD_BCVA: 20/70-1

## 2024-12-15 ASSESSMENT — SUPERFICIAL PUNCTATE KERATITIS (SPK)
OS_SPK: 2+
OD_SPK: 2+

## 2024-12-15 ASSESSMENT — TONOMETRY
OS_IOP_MMHG: 18
OD_IOP_MMHG: 18
OD_IOP_MMHG: 19

## 2024-12-16 ENCOUNTER — OFFICE (OUTPATIENT)
Dept: URBAN - METROPOLITAN AREA CLINIC 63 | Facility: CLINIC | Age: 79
Setting detail: OPHTHALMOLOGY
End: 2024-12-16
Payer: MEDICARE

## 2024-12-16 DIAGNOSIS — H59.022: ICD-10-CM

## 2024-12-16 PROBLEM — H16.223 DRY EYE SYNDROME K SICCA; BOTH EYES: Status: ACTIVE | Noted: 2024-12-15

## 2024-12-16 PROBLEM — H26.491 POSTERIOR CAPSULAR OPACIFICATION; RIGHT EYE: Status: ACTIVE | Noted: 2024-12-15

## 2024-12-16 PROCEDURE — 99024 POSTOP FOLLOW-UP VISIT: CPT | Performed by: OPHTHALMOLOGY

## 2024-12-16 ASSESSMENT — REFRACTION_AUTOREFRACTION
OS_AXIS: 089
OD_AXIS: 111
OD_CYLINDER: -0.75
OD_SPHERE: +0.50
OS_CYLINDER: -0.50
OS_SPHERE: +9.50

## 2024-12-16 ASSESSMENT — SUPERFICIAL PUNCTATE KERATITIS (SPK)
OD_SPK: 2+
OS_SPK: 2+

## 2024-12-16 ASSESSMENT — TONOMETRY: OS_IOP_MMHG: 21

## 2024-12-16 ASSESSMENT — VISUAL ACUITY
OD_BCVA: 20/70-1
OS_BCVA: 20/25-1

## 2024-12-16 ASSESSMENT — CONFRONTATIONAL VISUAL FIELD TEST (CVF)
OS_FINDINGS: FULL
OD_FINDINGS: FULL

## 2024-12-16 ASSESSMENT — KERATOMETRY
OD_K2POWER_DIOPTERS: 43.75
OS_AXISANGLE_DEGREES: 119
OD_K1POWER_DIOPTERS: 43.25
OD_AXISANGLE_DEGREES: 059
OS_K1POWER_DIOPTERS: 44.50
OS_K2POWER_DIOPTERS: 45.00

## 2025-01-29 ENCOUNTER — APPOINTMENT (OUTPATIENT)
Dept: PULMONOLOGY | Facility: CLINIC | Age: 80
End: 2025-01-29
Payer: MEDICARE

## 2025-01-29 VITALS
DIASTOLIC BLOOD PRESSURE: 98 MMHG | HEIGHT: 61 IN | SYSTOLIC BLOOD PRESSURE: 150 MMHG | BODY MASS INDEX: 34.17 KG/M2 | OXYGEN SATURATION: 95 % | HEART RATE: 87 BPM | WEIGHT: 181 LBS | RESPIRATION RATE: 16 BRPM

## 2025-01-29 DIAGNOSIS — J30.9 ALLERGIC RHINITIS, UNSPECIFIED: ICD-10-CM

## 2025-01-29 DIAGNOSIS — E66.9 OBESITY, UNSPECIFIED: ICD-10-CM

## 2025-01-29 DIAGNOSIS — J45.909 UNSPECIFIED ASTHMA, UNCOMPLICATED: ICD-10-CM

## 2025-01-29 DIAGNOSIS — G47.33 OBSTRUCTIVE SLEEP APNEA (ADULT) (PEDIATRIC): ICD-10-CM

## 2025-01-29 PROCEDURE — G2211 COMPLEX E/M VISIT ADD ON: CPT

## 2025-01-29 PROCEDURE — 99214 OFFICE O/P EST MOD 30 MIN: CPT

## 2025-02-11 ASSESSMENT — KERATOMETRY
OS_K1POWER_DIOPTERS: 44.50
OD_K2POWER_DIOPTERS: 43.75
OS_AXISANGLE_DEGREES: 119
OD_K1POWER_DIOPTERS: 43.25
OD_AXISANGLE_DEGREES: 059
OS_K2POWER_DIOPTERS: 45.00

## 2025-03-10 ENCOUNTER — OFFICE (OUTPATIENT)
Dept: URBAN - METROPOLITAN AREA CLINIC 105 | Facility: CLINIC | Age: 80
Setting detail: OPHTHALMOLOGY
End: 2025-03-10
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
## 2025-03-10 PROCEDURE — NO SHOW FE NO SHOW FEE: Performed by: OPHTHALMOLOGY

## 2025-06-03 ENCOUNTER — NON-APPOINTMENT (OUTPATIENT)
Age: 80
End: 2025-06-03

## 2025-06-04 ENCOUNTER — APPOINTMENT (OUTPATIENT)
Dept: CARDIOLOGY | Facility: CLINIC | Age: 80
End: 2025-06-04
Payer: MEDICARE

## 2025-06-04 ENCOUNTER — NON-APPOINTMENT (OUTPATIENT)
Age: 80
End: 2025-06-04

## 2025-06-04 VITALS
OXYGEN SATURATION: 96 % | DIASTOLIC BLOOD PRESSURE: 76 MMHG | HEART RATE: 81 BPM | HEIGHT: 62 IN | SYSTOLIC BLOOD PRESSURE: 132 MMHG

## 2025-06-04 DIAGNOSIS — E78.2 MIXED HYPERLIPIDEMIA: ICD-10-CM

## 2025-06-04 DIAGNOSIS — I10 ESSENTIAL (PRIMARY) HYPERTENSION: ICD-10-CM

## 2025-06-04 DIAGNOSIS — R93.1 ABNORMAL FINDINGS ON DIAGNOSTIC IMAGING OF HEART AND CORONARY CIRCULATION: ICD-10-CM

## 2025-06-04 PROCEDURE — 99204 OFFICE O/P NEW MOD 45 MIN: CPT

## 2025-06-04 PROCEDURE — 93000 ELECTROCARDIOGRAM COMPLETE: CPT

## 2025-06-04 PROCEDURE — G2211 COMPLEX E/M VISIT ADD ON: CPT

## 2025-06-12 ENCOUNTER — APPOINTMENT (OUTPATIENT)
Dept: CT IMAGING | Facility: CLINIC | Age: 80
End: 2025-06-12
Payer: MEDICARE

## 2025-06-12 ENCOUNTER — TRANSCRIPTION ENCOUNTER (OUTPATIENT)
Age: 80
End: 2025-06-12

## 2025-06-12 ENCOUNTER — OUTPATIENT (OUTPATIENT)
Dept: OUTPATIENT SERVICES | Facility: HOSPITAL | Age: 80
LOS: 1 days | End: 2025-06-12
Payer: MEDICARE

## 2025-06-12 DIAGNOSIS — R93.1 ABNORMAL FINDINGS ON DIAGNOSTIC IMAGING OF HEART AND CORONARY CIRCULATION: ICD-10-CM

## 2025-06-12 PROCEDURE — 75574 CT ANGIO HRT W/3D IMAGE: CPT | Mod: 26

## 2025-06-12 PROCEDURE — 75574 CT ANGIO HRT W/3D IMAGE: CPT

## 2025-06-17 ENCOUNTER — OUTPATIENT (OUTPATIENT)
Dept: OUTPATIENT SERVICES | Facility: HOSPITAL | Age: 80
LOS: 1 days | End: 2025-06-17
Payer: MEDICARE

## 2025-06-17 ENCOUNTER — RESULT REVIEW (OUTPATIENT)
Age: 80
End: 2025-06-17

## 2025-06-17 DIAGNOSIS — Z00.8 ENCOUNTER FOR OTHER GENERAL EXAMINATION: ICD-10-CM

## 2025-06-17 PROCEDURE — 75580 N-INVAS EST C FFR SW ALY CTA: CPT

## 2025-06-17 PROCEDURE — 75580 N-INVAS EST C FFR SW ALY CTA: CPT | Mod: 26

## 2025-06-30 ENCOUNTER — INPATIENT (INPATIENT)
Facility: HOSPITAL | Age: 80
LOS: 1 days | Discharge: HOME | DRG: 303 | End: 2025-07-02
Attending: INTERNAL MEDICINE | Admitting: INTERNAL MEDICINE
Payer: MEDICARE

## 2025-06-30 VITALS — HEART RATE: 82 BPM | WEIGHT: 179.9 LBS | HEIGHT: 63 IN

## 2025-06-30 DIAGNOSIS — R93.1 ABNORMAL FINDINGS ON DIAGNOSTIC IMAGING OF HEART AND CORONARY CIRCULATION: ICD-10-CM

## 2025-06-30 DIAGNOSIS — Z98.49 CATARACT EXTRACTION STATUS, UNSPECIFIED EYE: Chronic | ICD-10-CM

## 2025-06-30 LAB
ALBUMIN SERPL ELPH-MCNC: 4.5 G/DL — SIGNIFICANT CHANGE UP (ref 3.3–5)
ALP SERPL-CCNC: 48 U/L — SIGNIFICANT CHANGE UP (ref 40–120)
ALT FLD-CCNC: 49 U/L — HIGH (ref 10–45)
ANION GAP SERPL CALC-SCNC: 10 MMOL/L — SIGNIFICANT CHANGE UP (ref 5–17)
AST SERPL-CCNC: 58 U/L — HIGH (ref 10–40)
BILIRUB SERPL-MCNC: 1.5 MG/DL — HIGH (ref 0.2–1.2)
BUN SERPL-MCNC: 16 MG/DL — SIGNIFICANT CHANGE UP (ref 7–23)
CALCIUM SERPL-MCNC: 9.5 MG/DL — SIGNIFICANT CHANGE UP (ref 8.4–10.5)
CHLORIDE SERPL-SCNC: 105 MMOL/L — SIGNIFICANT CHANGE UP (ref 96–108)
CO2 SERPL-SCNC: 24 MMOL/L — SIGNIFICANT CHANGE UP (ref 22–31)
CREAT SERPL-MCNC: 0.67 MG/DL — SIGNIFICANT CHANGE UP (ref 0.5–1.3)
EGFR: 88 ML/MIN/1.73M2 — SIGNIFICANT CHANGE UP
EGFR: 88 ML/MIN/1.73M2 — SIGNIFICANT CHANGE UP
GLUCOSE SERPL-MCNC: 109 MG/DL — HIGH (ref 70–99)
HCT VFR BLD CALC: 43.4 % — SIGNIFICANT CHANGE UP (ref 34.5–45)
HGB BLD-MCNC: 14.2 G/DL — SIGNIFICANT CHANGE UP (ref 11.5–15.5)
MCHC RBC-ENTMCNC: 32 PG — SIGNIFICANT CHANGE UP (ref 27–34)
MCHC RBC-ENTMCNC: 32.7 G/DL — SIGNIFICANT CHANGE UP (ref 32–36)
MCV RBC AUTO: 97.7 FL — SIGNIFICANT CHANGE UP (ref 80–100)
NRBC # BLD AUTO: 0 K/UL — SIGNIFICANT CHANGE UP (ref 0–0)
NRBC # FLD: 0 K/UL — SIGNIFICANT CHANGE UP (ref 0–0)
NRBC BLD AUTO-RTO: 0 /100 WBCS — SIGNIFICANT CHANGE UP (ref 0–0)
PLATELET # BLD AUTO: 217 K/UL — SIGNIFICANT CHANGE UP (ref 150–400)
PMV BLD: 10.5 FL — SIGNIFICANT CHANGE UP (ref 7–13)
POTASSIUM SERPL-MCNC: 5.5 MMOL/L — HIGH (ref 3.5–5.3)
POTASSIUM SERPL-SCNC: 5.5 MMOL/L — HIGH (ref 3.5–5.3)
PROT SERPL-MCNC: 7.2 G/DL — SIGNIFICANT CHANGE UP (ref 6–8.3)
RBC # BLD: 4.44 M/UL — SIGNIFICANT CHANGE UP (ref 3.8–5.2)
RBC # FLD: 12.8 % — SIGNIFICANT CHANGE UP (ref 10.3–14.5)
SODIUM SERPL-SCNC: 139 MMOL/L — SIGNIFICANT CHANGE UP (ref 135–145)
WBC # BLD: 5.76 K/UL — SIGNIFICANT CHANGE UP (ref 3.8–10.5)
WBC # FLD AUTO: 5.76 K/UL — SIGNIFICANT CHANGE UP (ref 3.8–10.5)

## 2025-06-30 PROCEDURE — 93005 ELECTROCARDIOGRAM TRACING: CPT

## 2025-06-30 PROCEDURE — 93010 ELECTROCARDIOGRAM REPORT: CPT

## 2025-06-30 PROCEDURE — 80053 COMPREHEN METABOLIC PANEL: CPT

## 2025-06-30 PROCEDURE — 85027 COMPLETE CBC AUTOMATED: CPT

## 2025-06-30 PROCEDURE — 93010 ELECTROCARDIOGRAM REPORT: CPT | Mod: 77

## 2025-06-30 RX ORDER — ROSUVASTATIN CALCIUM 5 MG/1
10 TABLET, FILM COATED ORAL AT BEDTIME
Refills: 0 | Status: DISCONTINUED | OUTPATIENT
Start: 2025-06-30 | End: 2025-07-02

## 2025-06-30 RX ORDER — DORZOLAMIDE 20 MG/ML
1 SOLUTION/ DROPS OPHTHALMIC
Refills: 0 | Status: DISCONTINUED | OUTPATIENT
Start: 2025-06-30 | End: 2025-07-02

## 2025-06-30 RX ORDER — ASPIRIN 325 MG
1 TABLET ORAL
Refills: 0 | DISCHARGE

## 2025-06-30 RX ORDER — CLOPIDOGREL BISULFATE 75 MG/1
75 TABLET, FILM COATED ORAL DAILY
Refills: 0 | Status: DISCONTINUED | OUTPATIENT
Start: 2025-07-01 | End: 2025-07-02

## 2025-06-30 RX ORDER — LOSARTAN POTASSIUM 100 MG/1
1 TABLET, FILM COATED ORAL
Refills: 0 | DISCHARGE

## 2025-06-30 RX ORDER — ROSUVASTATIN CALCIUM 5 MG/1
1 TABLET, FILM COATED ORAL
Refills: 0 | DISCHARGE

## 2025-06-30 RX ORDER — ASPIRIN 325 MG
81 TABLET ORAL DAILY
Refills: 0 | Status: DISCONTINUED | OUTPATIENT
Start: 2025-06-30 | End: 2025-07-02

## 2025-06-30 RX ORDER — FLUTICASONE PROPIONATE 220 UG/1
2 AEROSOL, METERED RESPIRATORY (INHALATION)
Refills: 0 | DISCHARGE

## 2025-06-30 RX ORDER — LOSARTAN POTASSIUM 100 MG/1
100 TABLET, FILM COATED ORAL DAILY
Refills: 0 | Status: DISCONTINUED | OUTPATIENT
Start: 2025-06-30 | End: 2025-07-02

## 2025-06-30 RX ORDER — DORZOLAMIDE 20 MG/ML
1 SOLUTION/ DROPS OPHTHALMIC
Refills: 0 | DISCHARGE

## 2025-06-30 RX ADMIN — DORZOLAMIDE 1 DROP(S): 20 SOLUTION/ DROPS OPHTHALMIC at 20:37

## 2025-06-30 RX ADMIN — ROSUVASTATIN CALCIUM 10 MILLIGRAM(S): 5 TABLET, FILM COATED ORAL at 20:37

## 2025-06-30 RX ADMIN — Medication 75 MILLILITER(S): at 14:17

## 2025-06-30 RX ADMIN — Medication 250 MILLILITER(S): at 14:17

## 2025-06-30 NOTE — PROVIDER CONTACT NOTE (OTHER) - ACTION/TREATMENT ORDERED:
NP aware, IVF bolus running. Pt states "I feel like the forgetful moment passed." NP will assess pt as well.

## 2025-06-30 NOTE — PROVIDER CONTACT NOTE (OTHER) - SITUATION
Pt had a moment of forgetfulness but able to answer all questions appropriately. Daughter Corin at bedside.

## 2025-06-30 NOTE — H&P CARDIOLOGY - HISTORY OF PRESENT ILLNESS
Cards Dr. PINKY Mullen  80 year old female with significant PMHx of HTN, HLD and asthma presents for [facility]. Patient with c/o increasing SOB. CTFFR  performed with probably lesion to RCA. Now referred to Dr. Black for further ischemic evaluation. Currently denies chest pain, palpitations, orthopnea or PND.   CT FFR 6/2025  IMPRESSION:    At mid RCA region of concern on CTA, CT-FFR transition of 0.07 is noted. Beyond the region of plaque, RCA demonstrates abnormal values of 0.78-0.79, reflecting intermediate probability for lesion specific ischemia.    At mid LAD, first diagonal artery, proximal LCx, and first obtuse marginal artery, CT FFR findings demonstrate normal range values and gradual transition, reflecting low probability for lesions of acute ischemia.    Incidentally noted abnormal CT-FFR value at apical LAD, without direct anatomic correlate, likely due to distal location and small vessel caliber.       Cards Dr. PINKY Mullen  80 year old female with significant PMHx of HTN, HLD and asthma presents for [facility]. Patient with c/o increasing SOB. CTFFR  performed with probably lesion to RCA. Now referred to Dr. Black for further ischemic evaluation. Currently denies chest pain, palpitations, orthopnea or  PND CT FFR 6/2025  IMPRESSION:    At mid RCA region of concern on CTA, CT-FFR transition of 0.07 is noted. Beyond the region of plaque, RCA demonstrates abnormal values of 0.78-0.79, reflecting intermediate probability for lesion specific ischemia.    At mid LAD, first diagonal artery, proximal LCx, and first obtuse marginal artery, CT FFR findings demonstrate normal range values and gradual transition, reflecting low probability for lesions of acute ischemia.    Incidentally noted abnormal CT-FFR value at apical LAD, without direct anatomic correlate, likely due to distal location and small vessel caliber.

## 2025-06-30 NOTE — PATIENT PROFILE ADULT - DEAF OR HARD OF HEARING?
Detail Level: Zone Patient Specific Counseling (Will Not Stick From Patient To Patient): Possible photoeruption from hydrochlorothiazide.  Avoid sun exposure while taking hydrochlorothiazide; apply plenty of sunblock while outdoors as well as sun protective clothing. Detail Level: Detailed no

## 2025-06-30 NOTE — PROVIDER CONTACT NOTE (OTHER) - ASSESSMENT
pt is axox4 but forgetful at this time. when pt arrived she was axox4. pt states  she feels forgetful as well. VSS. no weakness noted b/l in upper or lower extremities. facial symmetry noted pt able to  answer all questions appropriately.

## 2025-07-01 ENCOUNTER — TRANSCRIPTION ENCOUNTER (OUTPATIENT)
Age: 80
End: 2025-07-01

## 2025-07-01 DIAGNOSIS — I25.10 ATHEROSCLEROTIC HEART DISEASE OF NATIVE CORONARY ARTERY WITHOUT ANGINA PECTORIS: ICD-10-CM

## 2025-07-01 DIAGNOSIS — E78.5 HYPERLIPIDEMIA, UNSPECIFIED: ICD-10-CM

## 2025-07-01 DIAGNOSIS — I10 ESSENTIAL (PRIMARY) HYPERTENSION: ICD-10-CM

## 2025-07-01 LAB
ADD ON TEST-SPECIMEN IN LAB: SIGNIFICANT CHANGE UP
ANION GAP SERPL CALC-SCNC: 12 MMOL/L — SIGNIFICANT CHANGE UP (ref 5–17)
BUN SERPL-MCNC: 14 MG/DL — SIGNIFICANT CHANGE UP (ref 7–23)
CALCIUM SERPL-MCNC: 8.9 MG/DL — SIGNIFICANT CHANGE UP (ref 8.4–10.5)
CHLORIDE SERPL-SCNC: 108 MMOL/L — SIGNIFICANT CHANGE UP (ref 96–108)
CHOLEST SERPL-MCNC: 163 MG/DL — SIGNIFICANT CHANGE UP
CO2 SERPL-SCNC: 23 MMOL/L — SIGNIFICANT CHANGE UP (ref 22–31)
CREAT SERPL-MCNC: 0.65 MG/DL — SIGNIFICANT CHANGE UP (ref 0.5–1.3)
EGFR: 89 ML/MIN/1.73M2 — SIGNIFICANT CHANGE UP
EGFR: 89 ML/MIN/1.73M2 — SIGNIFICANT CHANGE UP
GLUCOSE SERPL-MCNC: 95 MG/DL — SIGNIFICANT CHANGE UP (ref 70–99)
HDLC SERPL-MCNC: 52 MG/DL — SIGNIFICANT CHANGE UP
LDLC SERPL-MCNC: 88 MG/DL — SIGNIFICANT CHANGE UP
LIPID PNL WITH DIRECT LDL SERPL: 88 MG/DL — SIGNIFICANT CHANGE UP
NONHDLC SERPL-MCNC: 111 MG/DL — SIGNIFICANT CHANGE UP
POTASSIUM SERPL-MCNC: 4 MMOL/L — SIGNIFICANT CHANGE UP (ref 3.5–5.3)
POTASSIUM SERPL-SCNC: 4 MMOL/L — SIGNIFICANT CHANGE UP (ref 3.5–5.3)
SODIUM SERPL-SCNC: 143 MMOL/L — SIGNIFICANT CHANGE UP (ref 135–145)
TRIGL SERPL-MCNC: 133 MG/DL — SIGNIFICANT CHANGE UP

## 2025-07-01 PROCEDURE — 80061 LIPID PANEL: CPT

## 2025-07-01 PROCEDURE — 92928 PRQ TCAT PLMT NTRAC ST 1 LES: CPT | Mod: LD

## 2025-07-01 PROCEDURE — 80053 COMPREHEN METABOLIC PANEL: CPT

## 2025-07-01 PROCEDURE — 85027 COMPLETE CBC AUTOMATED: CPT

## 2025-07-01 PROCEDURE — 92921: CPT | Mod: LD

## 2025-07-01 PROCEDURE — 99232 SBSQ HOSP IP/OBS MODERATE 35: CPT

## 2025-07-01 PROCEDURE — 93454 CORONARY ARTERY ANGIO S&I: CPT | Mod: 59

## 2025-07-01 PROCEDURE — C1725: CPT

## 2025-07-01 PROCEDURE — C9600: CPT | Mod: RC

## 2025-07-01 PROCEDURE — C1874: CPT

## 2025-07-01 PROCEDURE — C1887: CPT

## 2025-07-01 PROCEDURE — C1894: CPT

## 2025-07-01 PROCEDURE — 93005 ELECTROCARDIOGRAM TRACING: CPT

## 2025-07-01 PROCEDURE — 80048 BASIC METABOLIC PNL TOTAL CA: CPT

## 2025-07-01 PROCEDURE — 99152 MOD SED SAME PHYS/QHP 5/>YRS: CPT

## 2025-07-01 PROCEDURE — 93010 ELECTROCARDIOGRAM REPORT: CPT

## 2025-07-01 PROCEDURE — C1769: CPT

## 2025-07-01 RX ORDER — METOPROLOL SUCCINATE 50 MG/1
25 TABLET, EXTENDED RELEASE ORAL DAILY
Refills: 0 | Status: DISCONTINUED | OUTPATIENT
Start: 2025-07-01 | End: 2025-07-01

## 2025-07-01 RX ORDER — CLOPIDOGREL BISULFATE 75 MG/1
1 TABLET, FILM COATED ORAL
Qty: 90 | Refills: 0
Start: 2025-07-01 | End: 2025-09-28

## 2025-07-01 RX ORDER — MAGNESIUM, ALUMINUM HYDROXIDE 200-200 MG
30 TABLET,CHEWABLE ORAL ONCE
Refills: 0 | Status: COMPLETED | OUTPATIENT
Start: 2025-07-01 | End: 2025-07-01

## 2025-07-01 RX ADMIN — CLOPIDOGREL BISULFATE 75 MILLIGRAM(S): 75 TABLET, FILM COATED ORAL at 05:09

## 2025-07-01 RX ADMIN — DORZOLAMIDE 1 DROP(S): 20 SOLUTION/ DROPS OPHTHALMIC at 10:29

## 2025-07-01 RX ADMIN — ROSUVASTATIN CALCIUM 10 MILLIGRAM(S): 5 TABLET, FILM COATED ORAL at 21:12

## 2025-07-01 RX ADMIN — DORZOLAMIDE 1 DROP(S): 20 SOLUTION/ DROPS OPHTHALMIC at 21:12

## 2025-07-01 RX ADMIN — Medication 30 MILLILITER(S): at 13:13

## 2025-07-01 RX ADMIN — LOSARTAN POTASSIUM 100 MILLIGRAM(S): 100 TABLET, FILM COATED ORAL at 05:09

## 2025-07-01 RX ADMIN — Medication 75 MILLILITER(S): at 10:29

## 2025-07-01 RX ADMIN — Medication 81 MILLIGRAM(S): at 05:09

## 2025-07-01 NOTE — DISCHARGE NOTE PROVIDER - NSDCCPCAREPLAN_GEN_ALL_CORE_FT
PRINCIPAL DISCHARGE DIAGNOSIS  Diagnosis: CAD (coronary artery disease)  Assessment and Plan of Treatment: Do not stop your aspirin or Plavix unless instructed to do so by your cardiologist, they help keep your stented arteries open. No heavy lifting or pushing/pulling with procedure arm for 2 weeks. No driving for 2 days. You may shower 24 hours following the procedure but avoid baths/swimming for 1 week. Check your wrist site for bleeding and/or swelling daily following procedure and call your doctor immediately if it occurs or if you experience increased pain at the site. Follow up with your cardiologist in 1-2 weeks. You may call [facility] if you have any questions/concerns regarding your procedure [phone].      SECONDARY DISCHARGE DIAGNOSES  Diagnosis: HTN (hypertension)  Assessment and Plan of Treatment: Continue with your blood pressure medications; eat a heart healthy diet with low salt diet; exercise regularly (consult with your physician or cardiologist first); maintain a heart healthy weight; if you smoke - quit (A resource to help you stop smoking is the [facility] [employer] for [employer] – phone number [phone].); include healthy ways to manage stress. Continue to follow with your primary care physician or cardiologist.    Diagnosis: HLD (hyperlipidemia)  Assessment and Plan of Treatment: Continue with your cholesterol medications. Eat a heart healthy diet that is low in saturated fats and salt, and includes whole grains, fruits, vegetables and lean protein; exercise regularly (consult with your physician or cardiologist first); maintain a heart healthy weight; if you smoke - quit (A resource to help you stop smoking is the [facility] [employer] for Tobacco Control – phone number [phone].). Continue to follow with your primary physician or cardiologist.

## 2025-07-01 NOTE — PROGRESS NOTE ADULT - SUBJECTIVE AND OBJECTIVE BOX
Patient is a 80y old  Female who presents with a chief complaint of GUAMAN (2025 00:40)          Allergies    No Known Allergies    Intolerances        Medications:  aspirin enteric coated 81 milliGRAM(s) Oral daily  clopidogrel Tablet 75 milliGRAM(s) Oral daily  dorzolamide 2% Ophthalmic Solution 1 Drop(s) Both EYES <User Schedule>  losartan 100 milliGRAM(s) Oral daily  rosuvastatin 10 milliGRAM(s) Oral at bedtime  sodium chloride 0.9%. 1000 milliLiter(s) IV Continuous <Continuous>      Vitals:  T(C): 36.7 (25 @ 00:19), Max: 36.8 (25 @ 16:49)  HR: 73 (25 @ 00:19) (71 - 85)  BP: 155/76 (25 @ 00:19) (119/55 - 219/86)  BP(mean): 109 (25 @ 00:19) (79 - 117)  RR: 17 (25 @ 00:19) (16 - 18)  SpO2: 97% (25 @ 00:19) (93% - 97%)  Wt(kg): --  Daily Height in cm: 160.02 (2025 08:28)    Daily Weight in k.6 (2025 08:14)  I&O's Summary        Physical Exam:  Appearance: Normal  Eyes: PERRL, EOMI  HENT: Normal oral mus[place]a, NC/AT  Cardiovascular: S1S2, RRR, No M/R/G, no JVD, No Lower extremity edema  Procedural Access Site: No hematoma, Non-tender to palpation, 2+ pulse, No bruit, No Ecchymosis  Respiratory: Clear to auscultation bilaterally  Gastrointestinal: Soft, Non tender, Normal Bowel Sounds  Musculoskeletal: No clubbing, No joint deformity   Neurologic: Non-focal  Lymphatic: No lymphadenopathy  Psychiatry: AAOx3, Mood & affect appropriate  Skin: No rashes, No ecchymoses, No cyanosis        143  |  108  |  14  ----------------------------<  95  4.0   |  23  |  0.65    Ca    8.9      2025 00:51    TPro  7.2  /  Alb  4.5  /  TBili  1.5[H]  /  DBili  x   /  AST  58[H]  /  ALT  49[H]  /  AlkPhos  48  06-30            Lipid panel   Hgb A1c                         14.2   5.76  )-----------( 217      ( 2025 08:44 )             43.4         ECG: SR 1st degree 82 bpm

## 2025-07-01 NOTE — DISCHARGE NOTE PROVIDER - CARE PROVIDER_API CALL
Roel Mullen [place]  Internal Medicine  [address] [place] [address]  Phone: [phone]  Fax: [phone]  Follow Up Time: 2 weeks

## 2025-07-01 NOTE — DISCHARGE NOTE PROVIDER - HOSPITAL COURSE
HPI:  Cards Dr. PINKY Mullen  80 year old female with significant PMHx of HTN, HLD and asthma presents for [facility]. Patient with c/o increasing SOB. CTFFR  performed with probably lesion to RCA. Now referred to Dr. Black for further ischemic evaluation. Currently denies chest pain, palpitations, orthopnea or PND.    6/30 cardiac cath with one NAYANA to the prox RCA via RRA access.  7/1 staged PCI      HPI:  Cards Dr. PINKY Mullen  80 year old female with significant PMHx of HTN, HLD and asthma presents for [facility]. Patient with c/o increasing SOB. CTFFR  performed with probably lesion to RCA. Now referred to Dr. Black for further ischemic evaluation. Currently denies chest pain, palpitations, orthopnea or PND.    6/30 cardiac cath with one NAYANA to the prox RCA via RRA access.  7/1 staged PCI HPI:  Cards Dr. PINKY Mullen  80 year old female with significant PMHx of HTN, HLD and asthma presents for [facility]. Patient with c/o increasing SOB. CTFFR  performed with probably lesion to RCA. Now referred to Dr. Black for further ischemic evaluation. Currently denies chest pain, palpitations, orthopnea or PND.    6/30 cardiac cath with one NAYANA to the prox RCA via RRA access.  7/1 staged PCI with one NAYANA to the prox LAD, POBA to the D1 via RRA access.

## 2025-07-01 NOTE — DISCHARGE NOTE PROVIDER - NPI NUMBER (FOR SYSADMIN USE ONLY) :
Called and left voicemail    Thx
Phone calls  from father and one from son looking for  results of echocardiogram   Please call back  Thank you 
[[phone]]

## 2025-07-01 NOTE — DISCHARGE NOTE PROVIDER - NSDCMRMEDTOKEN_GEN_ALL_CORE_FT
aspirin 81 mg oral delayed release tablet: 1 tab(s) orally once a day  Cardiac Rehab: 2-3x/ week x 12 weeks for CAD s/p PCI  dorzolamide 2% ophthalmic solution: 1 drop(s) in each affected eye 2 times a day left eye  fluticasone 110 mcg/inh inhalation aerosol: 2 puff(s) inhaled once a day  losartan 100 mg oral tablet: 1 tab(s) orally once a day  rosuvastatin 10 mg oral tablet: 1 tab(s) orally once a day   aspirin 81 mg oral delayed release tablet: 1 tab(s) orally once a day  Cardiac Rehab: 2-3x/ week x 12 weeks for CAD s/p PCI  clopidogrel 75 mg oral tablet: 1 tab(s) orally once a day  clopidogrel 75 mg oral tablet: 1 tab(s) orally once a day  dorzolamide 2% ophthalmic solution: 1 drop(s) in each affected eye 2 times a day left eye  fluticasone 110 mcg/inh inhalation aerosol: 2 puff(s) inhaled once a day  losartan 100 mg oral tablet: 1 tab(s) orally once a day  rosuvastatin 10 mg oral tablet: 1 tab(s) orally once a day

## 2025-07-01 NOTE — DISCHARGE NOTE PROVIDER - PROVIDER RX CONTACT NUMBER
3yF  pw  fever 3  days  + sick contacts sister with fever  also.  + rhinorrhea cough,   mom thought she  had a barking  cough  brought her into cool air .   vss, nontoxic, well appearing, pink conj, anicteric, MMM, no exudates, no retractions, no respiratory distress, CTAB, RRR, equal radial pulses bilat, abd soft/nt/nd, no peritoneal signs, no rashes,  crying  with tears.  + dry cough - no barking sound   Plan : outpt pediatrician follow up  tomorrow  Patient to be discharged from ED well appearing. Any available test results were discussed with parent/guardian.  Verbal instructions given, including instructions to return to ED immediately for any new, worsening, or concerning symptoms. Limitations of ED work up discussed.  Parent reports understanding of above with capacity and insight. Written discharge instructions additionally given, including follow-up plan.
[phone]

## 2025-07-01 NOTE — DISCHARGE NOTE PROVIDER - NSDCFUSCHEDAPPT_GEN_ALL_CORE_FT
[facility]  [facility]  Scheduled Appointment: 07/30/2025    [facility] Bullock  [facility]  Scheduled Appointment: 07/30/2025

## 2025-07-02 ENCOUNTER — TRANSCRIPTION ENCOUNTER (OUTPATIENT)
Age: 80
End: 2025-07-02

## 2025-07-02 VITALS — DIASTOLIC BLOOD PRESSURE: 69 MMHG | HEART RATE: 76 BPM | SYSTOLIC BLOOD PRESSURE: 157 MMHG

## 2025-07-02 PROBLEM — E78.5 HYPERLIPIDEMIA, UNSPECIFIED: Chronic | Status: ACTIVE | Noted: 2025-06-30

## 2025-07-02 PROCEDURE — C1725: CPT

## 2025-07-02 PROCEDURE — 80053 COMPREHEN METABOLIC PANEL: CPT

## 2025-07-02 PROCEDURE — 93005 ELECTROCARDIOGRAM TRACING: CPT

## 2025-07-02 PROCEDURE — C9600: CPT | Mod: LD

## 2025-07-02 PROCEDURE — C1887: CPT

## 2025-07-02 PROCEDURE — 80061 LIPID PANEL: CPT

## 2025-07-02 PROCEDURE — 99238 HOSP IP/OBS DSCHRG MGMT 30/<: CPT

## 2025-07-02 PROCEDURE — C1894: CPT

## 2025-07-02 PROCEDURE — 92921: CPT | Mod: LD

## 2025-07-02 PROCEDURE — 80048 BASIC METABOLIC PNL TOTAL CA: CPT

## 2025-07-02 PROCEDURE — C1874: CPT

## 2025-07-02 PROCEDURE — 93454 CORONARY ARTERY ANGIO S&I: CPT | Mod: 59

## 2025-07-02 PROCEDURE — C9600: CPT | Mod: RC

## 2025-07-02 PROCEDURE — 85027 COMPLETE CBC AUTOMATED: CPT

## 2025-07-02 PROCEDURE — C1769: CPT

## 2025-07-02 RX ADMIN — LOSARTAN POTASSIUM 100 MILLIGRAM(S): 100 TABLET, FILM COATED ORAL at 05:15

## 2025-07-02 RX ADMIN — Medication 81 MILLIGRAM(S): at 05:15

## 2025-07-02 RX ADMIN — CLOPIDOGREL BISULFATE 75 MILLIGRAM(S): 75 TABLET, FILM COATED ORAL at 05:15

## 2025-07-02 NOTE — DISCHARGE NOTE NURSING/CASE MANAGEMENT/SOCIAL WORK - FINANCIAL ASSISTANCE
[facility] provides services at a reduced cost to those who are determined to be eligible through [facility]’s financial assistance program. Information regarding [facility]’s financial assistance program can be found by going to [email or web address] or by calling [phone].

## 2025-07-07 ENCOUNTER — APPOINTMENT (OUTPATIENT)
Dept: CARDIOLOGY | Facility: CLINIC | Age: 80
End: 2025-07-07
Payer: MEDICARE

## 2025-07-07 VITALS
SYSTOLIC BLOOD PRESSURE: 154 MMHG | WEIGHT: 180 LBS | HEIGHT: 62 IN | DIASTOLIC BLOOD PRESSURE: 72 MMHG | OXYGEN SATURATION: 95 % | HEART RATE: 86 BPM | BODY MASS INDEX: 33.13 KG/M2

## 2025-07-07 PROBLEM — I25.10 CAD S/P PERCUTANEOUS CORONARY ANGIOPLASTY: Status: ACTIVE | Noted: 2025-07-07

## 2025-07-07 PROCEDURE — G2211 COMPLEX E/M VISIT ADD ON: CPT

## 2025-07-07 PROCEDURE — 99214 OFFICE O/P EST MOD 30 MIN: CPT

## 2025-07-07 RX ORDER — CLOPIDOGREL BISULFATE 75 MG/1
75 TABLET, FILM COATED ORAL DAILY
Qty: 90 | Refills: 1 | Status: ACTIVE | COMMUNITY
Start: 2025-07-07

## 2025-07-07 RX ORDER — AMLODIPINE BESYLATE 5 MG/1
5 TABLET ORAL
Qty: 90 | Refills: 1 | Status: ACTIVE | COMMUNITY
Start: 2025-07-07 | End: 1900-01-01

## 2025-07-08 PROBLEM — I10 ESSENTIAL (PRIMARY) HYPERTENSION: Chronic | Status: ACTIVE | Noted: 2025-06-30

## 2025-07-08 PROBLEM — J45.909 UNSPECIFIED ASTHMA, UNCOMPLICATED: Chronic | Status: ACTIVE | Noted: 2025-06-30

## 2025-07-18 ENCOUNTER — APPOINTMENT (OUTPATIENT)
Dept: CARDIOLOGY | Facility: CLINIC | Age: 80
End: 2025-07-18

## 2025-07-30 ENCOUNTER — APPOINTMENT (OUTPATIENT)
Dept: PULMONOLOGY | Facility: CLINIC | Age: 80
End: 2025-07-30
Payer: MEDICARE

## 2025-07-30 ENCOUNTER — RESULT CHARGE (OUTPATIENT)
Age: 80
End: 2025-07-30

## 2025-07-30 VITALS — WEIGHT: 185 LBS | BODY MASS INDEX: 34.93 KG/M2 | HEIGHT: 61 IN

## 2025-07-30 VITALS
RESPIRATION RATE: 16 BRPM | HEART RATE: 74 BPM | DIASTOLIC BLOOD PRESSURE: 78 MMHG | SYSTOLIC BLOOD PRESSURE: 132 MMHG | OXYGEN SATURATION: 97 %

## 2025-07-30 DIAGNOSIS — E66.9 OBESITY, UNSPECIFIED: ICD-10-CM

## 2025-07-30 DIAGNOSIS — G47.33 OBSTRUCTIVE SLEEP APNEA (ADULT) (PEDIATRIC): ICD-10-CM

## 2025-07-30 DIAGNOSIS — R91.8 OTHER NONSPECIFIC ABNORMAL FINDING OF LUNG FIELD: ICD-10-CM

## 2025-07-30 DIAGNOSIS — Z87.891 PERSONAL HISTORY OF NICOTINE DEPENDENCE: ICD-10-CM

## 2025-07-30 DIAGNOSIS — J45.909 UNSPECIFIED ASTHMA, UNCOMPLICATED: ICD-10-CM

## 2025-07-30 PROCEDURE — 99214 OFFICE O/P EST MOD 30 MIN: CPT | Mod: 25

## 2025-07-30 PROCEDURE — 94010 BREATHING CAPACITY TEST: CPT

## 2025-08-07 DIAGNOSIS — K21.9 GASTRO-ESOPHAGEAL REFLUX DISEASE W/OUT ESOPHAGITIS: ICD-10-CM

## 2025-08-07 RX ORDER — PANTOPRAZOLE 40 MG/1
40 TABLET, DELAYED RELEASE ORAL
Qty: 90 | Refills: 1 | Status: ACTIVE | COMMUNITY
Start: 2025-08-07 | End: 1900-01-01

## 2025-08-07 RX ORDER — CARVEDILOL 3.12 MG/1
3.12 TABLET, FILM COATED ORAL
Qty: 180 | Refills: 1 | Status: ACTIVE | COMMUNITY
Start: 2025-08-07 | End: 1900-01-01

## 2025-08-11 ENCOUNTER — TRANSCRIPTION ENCOUNTER (OUTPATIENT)
Age: 80
End: 2025-08-11

## 2025-08-20 ENCOUNTER — APPOINTMENT (OUTPATIENT)
Dept: CARDIOLOGY | Facility: CLINIC | Age: 80
End: 2025-08-20
Payer: MEDICARE

## 2025-08-20 PROCEDURE — 93798 PHYS/QHP OP CAR RHAB W/ECG: CPT

## 2025-08-25 ENCOUNTER — APPOINTMENT (OUTPATIENT)
Dept: CARDIOLOGY | Facility: CLINIC | Age: 80
End: 2025-08-25
Payer: MEDICARE

## 2025-08-25 PROCEDURE — 93798 PHYS/QHP OP CAR RHAB W/ECG: CPT

## 2025-08-27 ENCOUNTER — APPOINTMENT (OUTPATIENT)
Dept: CARDIOLOGY | Facility: CLINIC | Age: 80
End: 2025-08-27
Payer: MEDICARE

## 2025-08-27 PROCEDURE — 93798 PHYS/QHP OP CAR RHAB W/ECG: CPT

## 2025-09-03 ENCOUNTER — APPOINTMENT (OUTPATIENT)
Dept: CARDIOLOGY | Facility: CLINIC | Age: 80
End: 2025-09-03
Payer: MEDICARE

## 2025-09-03 PROCEDURE — 93798 PHYS/QHP OP CAR RHAB W/ECG: CPT

## 2025-09-08 ENCOUNTER — APPOINTMENT (OUTPATIENT)
Dept: CARDIOLOGY | Facility: CLINIC | Age: 80
End: 2025-09-08
Payer: MEDICARE

## 2025-09-08 PROCEDURE — 93798 PHYS/QHP OP CAR RHAB W/ECG: CPT

## 2025-09-10 ENCOUNTER — APPOINTMENT (OUTPATIENT)
Dept: CARDIOLOGY | Facility: CLINIC | Age: 80
End: 2025-09-10
Payer: MEDICARE

## 2025-09-10 PROCEDURE — 93798 PHYS/QHP OP CAR RHAB W/ECG: CPT

## 2025-09-15 ENCOUNTER — APPOINTMENT (OUTPATIENT)
Dept: CARDIOLOGY | Facility: CLINIC | Age: 80
End: 2025-09-15
Payer: MEDICARE

## 2025-09-15 PROCEDURE — 93798 PHYS/QHP OP CAR RHAB W/ECG: CPT

## 2025-09-17 ENCOUNTER — APPOINTMENT (OUTPATIENT)
Dept: CARDIOLOGY | Facility: CLINIC | Age: 80
End: 2025-09-17
Payer: MEDICARE

## 2025-09-17 PROCEDURE — 93798 PHYS/QHP OP CAR RHAB W/ECG: CPT

## 2025-09-29 RX ORDER — CLOPIDOGREL BISULFATE 75 MG/1
1 TABLET, FILM COATED ORAL
Qty: 90 | Refills: 0
Start: 2025-09-29 | End: 2025-12-27